# Patient Record
Sex: FEMALE | Race: OTHER | HISPANIC OR LATINO
[De-identification: names, ages, dates, MRNs, and addresses within clinical notes are randomized per-mention and may not be internally consistent; named-entity substitution may affect disease eponyms.]

---

## 2021-06-15 PROBLEM — Z00.00 ENCOUNTER FOR PREVENTIVE HEALTH EXAMINATION: Status: ACTIVE | Noted: 2021-06-15

## 2021-06-16 ENCOUNTER — APPOINTMENT (OUTPATIENT)
Dept: NEUROSURGERY | Facility: CLINIC | Age: 31
End: 2021-06-16

## 2021-07-15 ENCOUNTER — APPOINTMENT (OUTPATIENT)
Dept: NEUROSURGERY | Facility: CLINIC | Age: 31
End: 2021-07-15

## 2021-10-15 ENCOUNTER — APPOINTMENT (OUTPATIENT)
Dept: NEUROSURGERY | Facility: CLINIC | Age: 31
End: 2021-10-15

## 2021-10-19 ENCOUNTER — APPOINTMENT (OUTPATIENT)
Dept: RHEUMATOLOGY | Facility: CLINIC | Age: 31
End: 2021-10-19
Payer: MEDICAID

## 2021-10-19 VITALS
OXYGEN SATURATION: 98 % | DIASTOLIC BLOOD PRESSURE: 94 MMHG | RESPIRATION RATE: 14 BRPM | HEART RATE: 87 BPM | TEMPERATURE: 97.8 F | SYSTOLIC BLOOD PRESSURE: 128 MMHG | HEIGHT: 63 IN | WEIGHT: 176 LBS | BODY MASS INDEX: 31.18 KG/M2

## 2021-10-19 DIAGNOSIS — Z80.1 FAMILY HISTORY OF MALIGNANT NEOPLASM OF TRACHEA, BRONCHUS AND LUNG: ICD-10-CM

## 2021-10-19 DIAGNOSIS — Z78.9 OTHER SPECIFIED HEALTH STATUS: ICD-10-CM

## 2021-10-19 DIAGNOSIS — L03.213 PERIORBITAL CELLULITIS: ICD-10-CM

## 2021-10-19 DIAGNOSIS — G89.29 LOW BACK PAIN, UNSPECIFIED: ICD-10-CM

## 2021-10-19 DIAGNOSIS — M54.50 LOW BACK PAIN, UNSPECIFIED: ICD-10-CM

## 2021-10-19 PROCEDURE — 99204 OFFICE O/P NEW MOD 45 MIN: CPT

## 2021-10-19 RX ORDER — CEPHALEXIN 250 MG/1
250 CAPSULE ORAL
Refills: 0 | Status: ACTIVE | COMMUNITY
Start: 2021-10-19

## 2021-10-19 RX ORDER — AMOXICILLIN AND CLAVULANATE POTASSIUM 875; 125 MG/1; MG/1
875-125 TABLET, COATED ORAL
Qty: 20 | Refills: 0 | Status: ACTIVE | COMMUNITY
Start: 2021-10-19 | End: 1900-01-01

## 2021-10-19 NOTE — PHYSICAL EXAM
[Sclera] : the sclera and conjunctiva were normal [Auscultation Breath Sounds / Voice Sounds] : lungs were clear to auscultation bilaterally [Heart Sounds] : normal S1 and S2 [Heart Sounds Gallop] : no gallops [Murmurs] : no murmurs [Heart Sounds Pericardial Friction Rub] : no pericardial rub [Abdomen Soft] : soft [Abdomen Tenderness] : non-tender [] : no hepato-splenomegaly [Cervical Lymph Nodes Enlarged Posterior Bilaterally] : posterior cervical [Cervical Lymph Nodes Enlarged Anterior Bilaterally] : anterior cervical [Supraclavicular Lymph Nodes Enlarged Bilaterally] : supraclavicular [Axillary Lymph Nodes Enlarged Bilaterally] : axillary [FreeTextEntry1] : TENA negative, Shober negative  [Oriented To Time, Place, And Person] : oriented to person, place, and time

## 2021-10-19 NOTE — ASSESSMENT
[FreeTextEntry1] : 31 y/o F w chronic back pain\par =chronic lower back pain \par =worse w activities\par =am stiffness\par =Xray 3/21 w lumbar DDD\par =pt currently w swelling, erythema around R eye \par \par Back likely from lumbar DDD. Will r/o inflammatory back pain, but will not obtain labs right now, as ESR, CRP can be elevated from current R eye infection. \par \par Currently pt likely has preseptal cellulitis of R eye given appearance. Pt w no vision change or severe pain w movement of eyes, so doubt it is orbital cellulitis. However, counseled patient that if she develops worsening symptoms, such as increase in pain, pain on movement of eyes, vision loss, to please to to ER immediately. Will start abx but refer to ophto for further management. Told pt if she is unable to get appt this week to contact me, as I need to expedite appt. \par \par Plan\par -Labs w serologies, inflammatory markers when orbital infeciton resolve\par -Xrays SI joints\par -rx augmentin 875mg/125mg q12h X 10 days\par -optho referral given\par Will call pt back afer labs and xrays are done to discuss further management \par

## 2021-10-19 NOTE — DATA REVIEWED
[FreeTextEntry1] : Xrays reviewed from 3/21\par \par Moderate suluxation at L2-L3, mod degenerative disc change at L5-S1 and mild at L2-L3\par SI joints intact

## 2021-10-19 NOTE — CONSULT LETTER
[Dear  ___] : Dear  [unfilled], [Consult Letter:] : I had the pleasure of evaluating your patient, [unfilled]. [Consult Closing:] : Thank you very much for allowing me to participate in the care of this patient.  If you have any questions, please do not hesitate to contact me. [Sincerely,] : Sincerely, [FreeTextEntry2] : Dr. Francisco J Cage\par [address] [place] [address]\[place] [address]  [FreeTextEntry3] : Carlos Solis MD

## 2021-10-19 NOTE — HISTORY OF PRESENT ILLNESS
[FreeTextEntry1] : Referring physician: Dr. Francisco J Cage\par \par 31 y/o F here for consultation. \par \par Pt reports lower back pain for 1 year. Worse w activities. Wears back brace for support. Time of day does not matter. Some stiffness in lower back. \par Also reports tensions in shoulders. \par Pt has tried NSAIDs, tylenol, but it has not helped. \par \par Pt on Sunday felt like something is L eye. Pt tried a face product, and then pt developed R eye swelling. Pt has puss coming out of it. Pt is tender. Has pus coming out. No visual disturbance. \par Pt finishing abx for a nail. \par \par No fevers, h/a,  hair loss, oral ulcers, epistaxis, sinusitis,  swollen glands, dry mouth, dry eyes, CP, SOB, cough, vision changes, abdominal pain, GERD, n/v/d, blood in stool or urine, focal weakness, sensory loss,  Raynaud's, joint pain, swelling, weight loss. \par +cold sweats \par +nasal congestion, pt allergic to many things \par +feels swelling in front of ear \par +had toe nail taken off in L foot